# Patient Record
Sex: MALE | Race: NATIVE HAWAIIAN OR OTHER PACIFIC ISLANDER
[De-identification: names, ages, dates, MRNs, and addresses within clinical notes are randomized per-mention and may not be internally consistent; named-entity substitution may affect disease eponyms.]

---

## 2017-04-10 ENCOUNTER — HOSPITAL ENCOUNTER (OUTPATIENT)
Dept: HOSPITAL 47 - RADNMMAIN | Age: 47
Discharge: HOME | End: 2017-04-10
Payer: COMMERCIAL

## 2017-04-10 DIAGNOSIS — R53.83: Primary | ICD-10-CM

## 2017-04-10 PROCEDURE — 93017 CV STRESS TEST TRACING ONLY: CPT

## 2017-04-10 NOTE — EST
DATE OF SERVICE: 04/10/2017



AGE:   46Y        SEX:  M        HT:    69    WT:  220 lbs.           

Protocol Keo:  Other:   Stage:  Dur. of Exercise: 9 minutes



*Heart Rate         Blood Pressure                               

*Rest:  69          Rest:  136/99                                

*                              

*Max. Achieved:     162  Maximum BP:  167/64 

85% PMHR:  148

100% PMHR:  174          



*METS:  11.7   





INDICATIONS:  Fatigue.



MEDICATIONS:   Vitamins.



Patient was exercised for a total period of 9 minutes. The peak heart 

rate of 162 was achieved. Blood pressure of 167/64 mmHg was noted. 

Resting EKG shows normal sinus rhythm with normal [place] interval and QRS 

duration and normal ST-T waves. (      ) depression suggestive of 

ischemia is noted. Patient did not complain of any anginal pain during 

the test.  



FINAL IMPRESSION: 

1. This exercise test is not suggestive of ischemia. 

2. Patient's exercise tolerance is excellent. 

3. Patient did not complain of any chest pain during the test.

## 2017-10-16 ENCOUNTER — HOSPITAL ENCOUNTER (EMERGENCY)
Dept: HOSPITAL 47 - EC | Age: 47
Discharge: HOME | End: 2017-10-16
Payer: COMMERCIAL

## 2017-10-16 VITALS — DIASTOLIC BLOOD PRESSURE: 78 MMHG | TEMPERATURE: 97.9 F | SYSTOLIC BLOOD PRESSURE: 145 MMHG | HEART RATE: 68 BPM

## 2017-10-16 VITALS — RESPIRATION RATE: 16 BRPM

## 2017-10-16 DIAGNOSIS — Z96.651: ICD-10-CM
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
## 2017-10-16 DIAGNOSIS — M23.91: Primary | ICD-10-CM

## 2017-10-16 PROCEDURE — 99283 EMERGENCY DEPT VISIT LOW MDM: CPT

## 2017-10-16 NOTE — XR
EXAMINATION TYPE: XR knee complete RT

 

DATE OF EXAM: 10/16/2017

 

COMPARISON: NONE

 

HISTORY: Knee pain

 

TECHNIQUE: 3 views

 

FINDINGS: I see no fracture nor dislocation. There is right knee prosthesis. Components appear in saúl
tomic position.

 

IMPRESSION: No acute abnormality of the right knee

## 2017-10-16 NOTE — ED
Lower Extremity Injury HPI





- General


Chief Complaint: Extremity Injury, Lower


Stated Complaint: rt leg injury(hockey)


Time Seen by Provider: 10/16/17 22:19


Source: patient, RN notes reviewed


Mode of arrival: EMS


Limitations: no limitations





- History of Present Illness


Initial Comments: 





This is a 46-year-old male who presents to the emergency department with chief 

complaint of right knee pain.  Patient reports that approximately 2 years ago 

he had a right knee replacement.  He states that prior to arrival he was 

playing hockey when he felt his right knee "pop."  He reports that this injury 

happened randomly while skating and there is no mechanism of injury.  Patient 

states there is pain with weight-bearing and tenderness at the medial aspect of 

right knee.  He reports his physical therapist friend believes he may have a 

medial collateral ligament tear.  Currently rates his pain as 7/10. Denies fever

, chills, chest pain, shortness of breath, abdominal pain, nausea or vomiting, 

constipation or diarrhea, dysuria or hematuria, numbness or tingling, headache 

or vision changes.  





- Related Data


 Allergies











Allergy/AdvReac Type Severity Reaction Status Date / Time


 


No Known Allergies Allergy   Verified 10/16/17 22:30














Review of Systems


ROS Statement: 


Those systems with pertinent positive or pertinent negative responses have been 

documented in the HPI.





ROS Other: All systems not noted in ROS Statement are negative.





Past Medical History


Past Medical History: No Reported History


History of Any Multi-Drug Resistant Organisms: None Reported


Past Surgical History: Orthopedic Surgery


Additional Past Surgical History / Comment(s): R knee replacement


Past Psychological History: No Psychological Hx Reported


Smoking Status: Never smoker


Past Alcohol Use History: None Reported


Past Drug Use History: None Reported





General Exam





- General Exam Comments


Initial Comments: 





General: Awake and alert, well-developed; in no apparent distress.


HEENT: Head atraumatic, normocephalic. Pupils are equal, round and reactive to 

light. Extraocular movements intact. 


Neck: Supple. Normal ROM. 


Cardiovascular: Regular rate and rhythm. No murmurs, rubs or gallops. Chest 

symmetrical.  


Respiratory: Lungs clear to auscultation bilaterally. No wheezes, rales or 

rhonchi. Normal respiratory effort with no use of accessory muscles. 


Musculoskeletal: Right knee has no evidence of swelling, effusion or erythema. 

There is tenderness on palpation of the medial aspect of right knee.  Pain is 

elicited along medial joint line with valgus stress and Malachi's. Sensation 

is intact. Strength 5/5. Pulses 2+ equal and palpable bilaterally. 


Skin: Pink, warm and dry without rashes or lesions.  Right knee replacement 

scar noted.


Neurological: Alert and oriented x3. CN II-XII grossly intact. Speech is fluent 

and answers are appropriate. No focal neuro deficits. 


Psychiatric: Normal mood and affect. No overt signs of depression or anxiety 

noted. 











Limitations: no limitations





Course





 Vital Signs











  10/16/17





  22:25


 


Temperature 98.4 F


 


Pulse Rate 75


 


Respiratory 16





Rate 


 


Blood Pressure 161/90


 


O2 Sat by Pulse 98





Oximetry 














Medical Decision Making





- Medical Decision Making





This is a 46 year old male s/p right knee replacement who presents with 

complaint of right knee injury/pain.  X-ray of right knee revealed no acute 

abnormalities.  Patient refuses knee immobilizer.  He states that he has a 

local orthopedic doctor that he will consult for his knee.  Patient states he 

has ibuprofen and oxycodone at home for pain.  No prescription will be 

provided.  Patient is no acute distress at this time is in agreement with plan.

  All questions were answered.





- Radiology Data


Radiology results: report reviewed


Findings: No fracture nor dislocation.  There is right knee prosthesis.  

Components appear in anatomic position.  No acute abnormality of the right knee.








Disposition


Clinical Impression: 


 Acute internal derangement of knee





Disposition: HOME SELF-CARE


Condition: Good


Instructions:  Knee Sprain (ED)


Additional Instructions: 


Please follow up with orthopedics within 1-2 days.  Please follow up with 

primary care provider within 1-2 days. Return to emergency department if 

symptoms should worsen or any concerns arise. 


Referrals: 


Geoffrey Felton MD [Primary Care Provider] - 1-2 days


Time of Disposition: 23:06

## 2017-11-20 ENCOUNTER — HOSPITAL ENCOUNTER (OUTPATIENT)
Dept: HOSPITAL 47 - RADXRMAIN | Age: 47
Discharge: HOME | End: 2017-11-20
Payer: COMMERCIAL

## 2017-11-20 DIAGNOSIS — S50.02XA: Primary | ICD-10-CM

## 2017-11-20 NOTE — XR
EXAMINATION TYPE: XR elbow complete LT

 

DATE OF EXAM: 11/20/2017

 

CLINICAL HISTORY: pain

 

TECHNIQUE:  Frontal, lateral and oblique images of the left elbow are obtained.

 

COMPARISON: None.

 

FINDINGS:  There is no acute fracture/dislocation evident of the elbow.  No abnormal fat pad signs ar
e seen.  The overlying soft tissue appears unremarkable.

 

IMPRESSION: 

 

 There is no acute fracture or dislocation of the elbow.

 

ICD 10 NO FRACTURE, INITIAL EVALUATION

## 2018-11-06 ENCOUNTER — HOSPITAL ENCOUNTER (OUTPATIENT)
Dept: HOSPITAL 47 - RADECHMAIN | Age: 48
Discharge: HOME | End: 2018-11-06
Attending: NURSE PRACTITIONER
Payer: COMMERCIAL

## 2018-11-06 DIAGNOSIS — I34.0: ICD-10-CM

## 2018-11-06 DIAGNOSIS — I37.1: ICD-10-CM

## 2018-11-06 DIAGNOSIS — I51.7: Primary | ICD-10-CM

## 2018-11-06 PROCEDURE — 93306 TTE W/DOPPLER COMPLETE: CPT

## 2018-11-06 NOTE — ECHOF
Referral Reason:R03.0 Elevated blood pressure



MEASUREMENTS

--------

HEIGHT: 177.8 cm

WEIGHT: 93.0 kg

BP: 

RVIDd:   2.4 cm     (< 3.3)

IVSd:   1.2 cm     (0.6 - 1.1)

LVIDd:   4.7 cm     (3.9 - 5.3)

LVPWd:   1.3 cm     (0.6 - 1.1)

IVSs:   1.6 cm

LVIDs:   2.6 cm

LVPWs:   1.6 cm

LAESV Index (A-L):   34.21 ml/m

Ao Diam:   3.6 cm     (2.0 - 3.7)

AV Cusp:   2.0 cm     (1.5 - 2.6)

LA Diam:   3.5 cm     (2.7 - 3.8)

MV EXCURSION:   18.547 mm     (> 18.000)

MV EF SLOPE:   98 mm/s     (70 - 150)

EPSS:   0.6 cm

MV E Mick:   0.81 m/s

MV DecT:   303 ms

MV A Mick:   0.51 m/s

MV E/A Ratio:   1.58 

RAP:   5.00 mmHg

RVSP:   10.99 mmHg







FINDINGS

--------

Sinus rhythm.

This was a technically good study.

The left ventricular size is normal.   There is mild concentric left ventricular hypertrophy.   Overa
ll left ventricular systolic function is normal with, an EF between 55 - 60 %.

The right ventricle is normal in size and function.

The left atrium is mildly dilated.

RA appears enlarged.

The aortic valve is trileaflet, and appears structurally normal. No aortic stenosis or regurgitation.


The mitral valve leaflets are mildly thickened.   There is trace to mild mitral regurgitation.   Prom
inent papillary muscle visualized.

Trace tricuspid regurgitation present.   Right ventricular systolic pressure is normal at < 35 mmHg. 
  There is no evidence of pulmonary hypertension.

Trace/mild (physiologic)  pulmonic regurgitation.

The aortic root size is normal.

Normal inferior vena cava with normal inspiratory collapse consistent with estimated right atrial pre
ssure of  5 mmHg.

There is no pericardial effusion.



CONCLUSIONS

--------

1. Sinus rhythm.

2. This was a technically good study.

3. The left ventricular size is normal.

4. There is mild concentric left ventricular hypertrophy.

5. Overall left ventricular systolic function is normal with, an EF between 55 - 60 %.

6. The left atrium is mildly dilated.

7. RA appears enlarged.

8. The aortic valve is trileaflet, and appears structurally normal. No aortic stenosis or regurgitati
on.

9. The mitral valve leaflets are mildly thickened.

10. There is trace to mild mitral regurgitation.

11. Prominent papillary muscle visualized.

12. Trace tricuspid regurgitation present.

13. Right ventricular systolic pressure is normal at < 35 mmHg.

14. There is no evidence of pulmonary hypertension.

15. Trace/mild (physiologic)  pulmonic regurgitation.

16. The aortic root size is normal.

17. There is no pericardial effusion.





SONOGRAPHER: Tim Barnett RDCS

## 2019-09-26 ENCOUNTER — HOSPITAL ENCOUNTER (OUTPATIENT)
Dept: HOSPITAL 47 - RADXRMAIN | Age: 49
Discharge: HOME | End: 2019-09-26
Payer: COMMERCIAL

## 2019-09-26 DIAGNOSIS — S46.812A: Primary | ICD-10-CM

## 2019-09-26 NOTE — XR
EXAMINATION TYPE: XR shoulder complete LT

 

DATE OF EXAM: 9/26/2019

 

COMPARISON: NONE

 

HISTORY: Pain

 

TECHNIQUE:  Shoulder examined in 3 views

 

FINDINGS: 

The humeral head articulates with the glenoid.

The acromio-clavicular junction is normal.

No acute fractures or dislocations are evident.

 

A follow up study can be performed 7-10 days from acute trauma for continued pain.

 

IMPRESSION:

1. Normal 3 view left Shoulder

## 2021-06-10 ENCOUNTER — HOSPITAL ENCOUNTER (EMERGENCY)
Dept: HOSPITAL 47 - EC | Age: 51
Discharge: HOME | End: 2021-06-10
Payer: COMMERCIAL

## 2021-06-10 VITALS — RESPIRATION RATE: 16 BRPM | DIASTOLIC BLOOD PRESSURE: 81 MMHG | HEART RATE: 75 BPM | SYSTOLIC BLOOD PRESSURE: 139 MMHG

## 2021-06-10 VITALS — TEMPERATURE: 98 F

## 2021-06-10 DIAGNOSIS — N13.2: Primary | ICD-10-CM

## 2021-06-10 LAB
ALBUMIN SERPL-MCNC: 4.1 G/DL (ref 3.5–5)
ALP SERPL-CCNC: 76 U/L (ref 38–126)
ALT SERPL-CCNC: 22 U/L (ref 4–49)
ANION GAP SERPL CALC-SCNC: 7 MMOL/L
AST SERPL-CCNC: 36 U/L (ref 17–59)
BASOPHILS # BLD AUTO: 0 K/UL (ref 0–0.2)
BASOPHILS NFR BLD AUTO: 0 %
BUN SERPL-SCNC: 17 MG/DL (ref 9–20)
CALCIUM SPEC-MCNC: 9 MG/DL (ref 8.4–10.2)
CHLORIDE SERPL-SCNC: 105 MMOL/L (ref 98–107)
CO2 SERPL-SCNC: 25 MMOL/L (ref 22–30)
EOSINOPHIL # BLD AUTO: 0 K/UL (ref 0–0.7)
EOSINOPHIL NFR BLD AUTO: 0 %
ERYTHROCYTE [DISTWIDTH] IN BLOOD BY AUTOMATED COUNT: 4.87 M/UL (ref 4.3–5.9)
ERYTHROCYTE [DISTWIDTH] IN BLOOD: 14.7 % (ref 11.5–15.5)
GLUCOSE SERPL-MCNC: 96 MG/DL (ref 74–99)
HCT VFR BLD AUTO: 46.6 % (ref 39–53)
HGB BLD-MCNC: 15.1 GM/DL (ref 13–17.5)
KETONES UR QL STRIP.AUTO: (no result)
LIPASE SERPL-CCNC: 203 U/L (ref 23–300)
LYMPHOCYTES # SPEC AUTO: 0.7 K/UL (ref 1–4.8)
LYMPHOCYTES NFR SPEC AUTO: 7 %
MCH RBC QN AUTO: 31 PG (ref 25–35)
MCHC RBC AUTO-ENTMCNC: 32.4 G/DL (ref 31–37)
MCV RBC AUTO: 95.8 FL (ref 80–100)
MONOCYTES # BLD AUTO: 0.3 K/UL (ref 0–1)
MONOCYTES NFR BLD AUTO: 4 %
NEUTROPHILS # BLD AUTO: 8.6 K/UL (ref 1.3–7.7)
NEUTROPHILS NFR BLD AUTO: 88 %
PH UR: 7.5 [PH] (ref 5–8)
PLATELET # BLD AUTO: 220 K/UL (ref 150–450)
POTASSIUM SERPL-SCNC: 4.3 MMOL/L (ref 3.5–5.1)
PROT SERPL-MCNC: 6.4 G/DL (ref 6.3–8.2)
RBC UR QL: 7 /HPF (ref 0–5)
SODIUM SERPL-SCNC: 137 MMOL/L (ref 137–145)
SP GR UR: 1.02 (ref 1–1.03)
UROBILINOGEN UR QL STRIP: <2 MG/DL (ref ?–2)
WBC # BLD AUTO: 9.8 K/UL (ref 3.8–10.6)
WBC # UR AUTO: 2 /HPF (ref 0–5)

## 2021-06-10 PROCEDURE — 81001 URINALYSIS AUTO W/SCOPE: CPT

## 2021-06-10 PROCEDURE — 36415 COLL VENOUS BLD VENIPUNCTURE: CPT

## 2021-06-10 PROCEDURE — 80053 COMPREHEN METABOLIC PANEL: CPT

## 2021-06-10 PROCEDURE — 83690 ASSAY OF LIPASE: CPT

## 2021-06-10 PROCEDURE — 96374 THER/PROPH/DIAG INJ IV PUSH: CPT

## 2021-06-10 PROCEDURE — 96375 TX/PRO/DX INJ NEW DRUG ADDON: CPT

## 2021-06-10 PROCEDURE — 96361 HYDRATE IV INFUSION ADD-ON: CPT

## 2021-06-10 PROCEDURE — 99284 EMERGENCY DEPT VISIT MOD MDM: CPT

## 2021-06-10 PROCEDURE — 74176 CT ABD & PELVIS W/O CONTRAST: CPT

## 2021-06-10 PROCEDURE — 85025 COMPLETE CBC W/AUTO DIFF WBC: CPT

## 2021-06-10 NOTE — CT
EXAMINATION TYPE: CT abdomen pelvis wo con

 

DATE OF EXAM: 6/10/2021

 

COMPARISON:

 

HISTORY: Right flank pain radiating to groin.

 

CT DLP: 602.3 mGycm

Automated exposure control for dose reduction was used.

 

TECHNIQUE:  Helical acquisition of images was performed from the lung bases through the pelvis.

 

FINDINGS: Within limitations of noncontrast CT the following observations are made:

 

LUNG BASES: No significant abnormality is appreciated.

 

LIVER/GB: No significant abnormality is appreciated.

 

PANCREAS: No significant abnormality is seen.

 

SPLEEN: No significant abnormality is seen.

 

ADRENALS: No significant abnormality is seen.

 

KIDNEYS: There is moderate right-sided hydronephrosis and hydroureter down to the right ureterovesica
l junction where there is a 2 mm distal ureteral calcification. 

   Left kidney demonstrates two 3 mm nonobstructing calcifications, one within the midpole and one in
 the lower pole

 

FREE AIR:  No free air is visualized

 

RETROPERITONEAL ADENOPATHY:  None visualized

 

REPRODUCTIVE ORGANS: No significant abnormality is seen

 

URINARY BLADDER:  No significant abnormality is seen.

 

PELVIC ADENOPATHY:  None visualized.

 

OSSEOUS STRUCTURES:  No significant abnormality is seen.

 

BOWEL:  No significant abnormality is seen. Appendix is unremarkable.

 

 

IMPRESSION: 

POSITIVE FOR RIGHT OBSTRUCTIVE UROPATHY AS DISCUSSED.

## 2021-06-10 NOTE — ED
Back Pain HPI





- General


Chief Complaint: Back Pain/Injury


Stated Complaint: Lower back pain, nausea


Time Seen by Provider: 06/10/21 17:13


Source: patient


Limitations: no limitations





- History of Present Illness


Initial Comments: 





50-year-old male presents to the emergency department with a chief complaint of 

back pain.  States this occurred around noon today and it was a sudden onset in 

the right flank region with some radiation towards the abdomen and groin region.

 Reports nausea and multiple episodes of nonbilious and nonbloody vomiting.  

States the pain comes and goes, sharp in nature in 10/10 whenever he is on.  

Denies any obstructive or infectious urinary symptoms.  Denies any hematuria, 

hematochezia or melena.  No history of kidney stones.  Abdominal Surgical hist

ory of hernia.





- Related Data


                                  Previous Rx's











 Medication  Instructions  Recorded


 


Ketorolac [Toradol] 10 mg PO Q8HR #15 tab 06/10/21


 


Ondansetron Odt [Zofran Odt] 4 mg PO Q8HR PRN #10 tab 06/10/21


 


Tamsulosin [Flomax] 0.4 mg PO DAILY #7 cap 06/10/21











                                    Allergies











Allergy/AdvReac Type Severity Reaction Status Date / Time


 


No Known Allergies Allergy   Verified 06/10/21 17:04














Review of Systems


ROS Statement: 


Those systems with pertinent positive or pertinent negative responses have been 

documented in the HPI.





ROS Other: All systems not noted in ROS Statement are negative.





Past Medical History


Past Medical History: No Reported History


History of Any Multi-Drug Resistant Organisms: None Reported


Past Surgical History: Orthopedic Surgery


Additional Past Surgical History / Comment(s): R knee replacement, hip, elbow, 

Hernia


Past Psychological History: No Psychological Hx Reported


Smoking Status: Never smoker


Past Alcohol Use History: Occasional


Past Drug Use History: None Reported





General Exam


Limitations: no limitations


General appearance: alert, in no apparent distress


Head exam: Present: atraumatic, normocephalic, normal inspection


Eye exam: Present: normal appearance, PERRL, EOMI


Pupils: Present: normal accommodation


ENT exam: Present: normal exam, normal oropharynx, mucous membranes moist


Neck exam: Present: normal inspection, full ROM.  Absent: tenderness, 

lymphadenopathy


Respiratory exam: Present: normal lung sounds bilaterally.  Absent: respiratory 

distress


Cardiovascular Exam: Present: regular rate, normal rhythm, normal heart sounds. 

 Absent: systolic murmur


GI/Abdominal exam: Present: soft, tenderness (Right flank).  Absent: distended, 

guarding, rebound, rigid


Extremities exam: Present: normal inspection, full ROM, normal capillary refill.

  Absent: tenderness, pedal edema, joint swelling


Back exam: Present: normal inspection, full ROM, tenderness, CVA tenderness (R).

  Absent: CVA tenderness (L)


Neurological exam: Present: alert, oriented X3, normal gait


Psychiatric exam: Present: normal affect, normal mood


Skin exam: Present: warm, dry, intact, normal color





Course


                                   Vital Signs











  06/10/21





  17:01


 


Temperature 98 F


 


Pulse Rate 69


 


Respiratory 20





Rate 


 


Blood Pressure 135/88


 


O2 Sat by Pulse 99





Oximetry 














Medical Decision Making





- Medical Decision Making





50-year-old male presents to emergency Department with a chief complaint of 

abdominal pain.  On physical examination, right CVA tenderness.  Patient was 

given IV fluids, antiemetics and Toradol for analgesia.  A reevaluation, he 

reports improvement of symptoms.  CBC CMP unremarkable.  UA shows mild 

microscopic hematuria.  CT of the abdomen and pelvis without contrast reveals 

mild right-sided hydronephrosis with a 2 mm stone at the UVJ.  Patient will be 

discharged with Zofran Flomax and Toradol for pain.  Advised to follow-up with 

urology.  Return parameters were discussed with patient was understanding and 

agreeable.  Case discussed with Dr. Velazquez





- Lab Data


Result diagrams: 


                                 06/10/21 17:25





                                 06/10/21 17:25


                                   Lab Results











  06/10/21 06/10/21 06/10/21 Range/Units





  17:25 17:25 17:25 


 


WBC  9.8    (3.8-10.6)  k/uL


 


RBC  4.87    (4.30-5.90)  m/uL


 


Hgb  15.1    (13.0-17.5)  gm/dL


 


Hct  46.6    (39.0-53.0)  %


 


MCV  95.8    (80.0-100.0)  fL


 


MCH  31.0    (25.0-35.0)  pg


 


MCHC  32.4    (31.0-37.0)  g/dL


 


RDW  14.7    (11.5-15.5)  %


 


Plt Count  220    (150-450)  k/uL


 


MPV  6.9    


 


Neutrophils %  88    %


 


Lymphocytes %  7    %


 


Monocytes %  4    %


 


Eosinophils %  0    %


 


Basophils %  0    %


 


Neutrophils #  8.6 H    (1.3-7.7)  k/uL


 


Lymphocytes #  0.7 L    (1.0-4.8)  k/uL


 


Monocytes #  0.3    (0-1.0)  k/uL


 


Eosinophils #  0.0    (0-0.7)  k/uL


 


Basophils #  0.0    (0-0.2)  k/uL


 


Sodium    137  (137-145)  mmol/L


 


Potassium    4.3  (3.5-5.1)  mmol/L


 


Chloride    105  ()  mmol/L


 


Carbon Dioxide    25  (22-30)  mmol/L


 


Anion Gap    7  mmol/L


 


BUN    17  (9-20)  mg/dL


 


Creatinine    1.11  (0.66-1.25)  mg/dL


 


Est GFR (CKD-EPI)AfAm    89  (>60 ml/min/1.73 sqM)  


 


Est GFR (CKD-EPI)NonAf    77  (>60 ml/min/1.73 sqM)  


 


Glucose    96  (74-99)  mg/dL


 


Calcium    9.0  (8.4-10.2)  mg/dL


 


Total Bilirubin    1.1  (0.2-1.3)  mg/dL


 


AST    36  (17-59)  U/L


 


ALT    22  (4-49)  U/L


 


Alkaline Phosphatase    76  ()  U/L


 


Total Protein    6.4  (6.3-8.2)  g/dL


 


Albumin    4.1  (3.5-5.0)  g/dL


 


Lipase    203  ()  U/L


 


Urine Color   Yellow   


 


Urine Appearance   Cloudy   (Clear)  


 


Urine pH   7.5   (5.0-8.0)  


 


Ur Specific Gravity   1.021   (1.001-1.035)  


 


Urine Protein   Negative   (Negative)  


 


Urine Glucose (UA)   Negative   (Negative)  


 


Urine Ketones   3+ H   (Negative)  


 


Urine Blood   Negative   (Negative)  


 


Urine Nitrite   Negative   (Negative)  


 


Urine Bilirubin   Negative   (Negative)  


 


Urine Urobilinogen   <2.0   (<2.0)  mg/dL


 


Ur Leukocyte Esterase   Negative   (Negative)  


 


Urine RBC   7 H   (0-5)  /hpf


 


Urine WBC   2   (0-5)  /hpf














Disposition


Clinical Impression: 


 Nephrolithiasis, Hydronephrosis





Disposition: HOME SELF-CARE


Condition: Stable


Instructions (If sedation given, give patient instructions):  Kidney Stones (ED)


Additional Instructions: 


Please return to the Emergency Department if symptoms worsen or any other 

concerns.  Take prescribed medication as directed.  Drink plenty of fluids.  

Follow-up with urology.


Is patient prescribed a controlled substance at d/c from ED?: No


Referrals: 


Geoffrey Felton MD [Primary Care Provider] - 1-2 days


Chalo Dueñas MD [STAFF PHYSICIAN] - 1-2 days